# Patient Record
Sex: FEMALE | URBAN - METROPOLITAN AREA
[De-identification: names, ages, dates, MRNs, and addresses within clinical notes are randomized per-mention and may not be internally consistent; named-entity substitution may affect disease eponyms.]

---

## 2021-08-12 ENCOUNTER — OFFICE VISIT (OUTPATIENT)
Dept: OBGYN CLINIC | Age: 41
End: 2021-08-12
Payer: MEDICAID

## 2021-08-12 VITALS — SYSTOLIC BLOOD PRESSURE: 132 MMHG | DIASTOLIC BLOOD PRESSURE: 84 MMHG | WEIGHT: 134 LBS

## 2021-08-12 DIAGNOSIS — N92.6 MISSED MENSES: ICD-10-CM

## 2021-08-12 DIAGNOSIS — N97.9 INFERTILITY, FEMALE: ICD-10-CM

## 2021-08-12 DIAGNOSIS — N92.6 IRREGULAR PERIODS: Primary | ICD-10-CM

## 2021-08-12 LAB
HCG URINE, QL. (POC): NEGATIVE
VALID INTERNAL CONTROL?: YES

## 2021-08-12 PROCEDURE — 99203 OFFICE O/P NEW LOW 30 MIN: CPT | Performed by: OBSTETRICS & GYNECOLOGY

## 2021-08-12 PROCEDURE — 81025 URINE PREGNANCY TEST: CPT | Performed by: OBSTETRICS & GYNECOLOGY

## 2021-08-12 NOTE — PROGRESS NOTES
Problem Visit    Jonnathan Orourke is a 39 y.o.  presenting for problem visit. Her main concern today is irregular menses. Entire visit (nurse and doctor) performed using translation services with [email or web address] . Patient report she has not had a period since April 14. Prior to this time she had regular monthly periods, but they had been very light. She had not done a home UPT since menses stopped, this was negative in the office today. Patient reports she has tried to get pregnant for 17 years without success. She reports she saw a fertility specialist 5-6 years ago in [place] named Dr. Lolita Cordova. No pmh, no recent life or medical stressors/changes recently. Ob/Gyn Hx:  G0  LMP- 4/14/21  Menses- currently absent  Contraception- none  SA- yes, male      Past Medical History:   Diagnosis Date    H/O seasonal allergies 3/11/2013       No past surgical history on file. No family history on file. Social History     Socioeconomic History    Marital status: SINGLE     Spouse name: Not on file    Number of children: Not on file    Years of education: Not on file    Highest education level: Not on file   Occupational History    Not on file   Tobacco Use    Smoking status: Not on file   Substance and Sexual Activity    Alcohol use: Not on file    Drug use: Not on file    Sexual activity: Not on file   Other Topics Concern    Not on file   Social History Narrative    Not on file     Social Determinants of Health     Financial Resource Strain:     Difficulty of Paying Living Expenses:    Food Insecurity:     Worried About Running Out of Food in the Last Year:     [address] in the Last Year:    Transportation Needs:     Lack of Transportation (Medical):      Lack of Transportation (Non-Medical):    Physical Activity:     Days of Exercise per Week:     Minutes of Exercise per Session:    Stress:     Feeling of Stress :    Social Connections:     Frequency of Communication with Friends and Family:     Frequency of Social Gatherings with Friends and Family:     Attends [employer] Services:     Active Member of Clubs or Organizations:     Attends Club or Organization Meetings:     Marital Status:    Intimate Partner Violence:     Fear of Current or Ex-Partner:     Emotionally Abused:     Physically Abused:     Sexually Abused:            No Known Allergies    Review of Systems - History obtained from the patient  Constitutional: negative for weight loss, fever, night sweats  HEENT: negative for hearing loss, earache, congestion, snoring, sorethroat  CV: negative for chest pain, palpitations, edema  Resp: negative for cough, shortness of breath, wheezing  GI: negative for change in bowel habits, abdominal pain, black or bloody stools  : negative for frequency, dysuria, hematuria, vaginal discharge  MSK: negative for back pain, joint pain, muscle pain  Breast: negative for breast lumps, nipple discharge, galactorrhea  Skin :negative for itching, rash, hives  Neuro: negative for dizziness, headache, confusion, weakness  Psych: negative for anxiety, depression, change in mood  Heme/lymph: negative for bleeding, bruising, pallor    Physical Exam    Visit Vitals  /84 (BP 1 Location: Left arm, BP Patient Position: Sitting)   Wt 134 lb (60.8 kg)         OBGyn Exam      Constitutional  · Appearance: well-nourished, well developed, alert, in no acute distress    HENT  · Head and Face: appears normal    Neck  · Inspection/Palpation: normal appearance, no masses or tenderness  · Thyroid: gland size normal, nontender    Chest  · Respiratory Effort: non-labored breathing    Cardiovascular  · Extremities: no peripheral edema    Gastrointestinal  · Abdominal Examination: abdomen non-distended, non-tender to palpation, no masses present  · Liver and spleen: no hepatomegaly present, spleen not palpable  · Hernias: no hernias identified      Skin  · General Inspection: no rash, no lesions identified    Neurologic/Psychiatric  · Mental Status:  · Orientation: grossly oriented to person, place and time  · Mood and Affect: mood normal, affect appropriate      Assessment/Plan:    1. Missed menses    - AMB POC URINE PREGNANCY TEST, VISUAL COLOR COMPARISON    2. Irregular periods  Discussed her 3 months of amenorrhea, with previously normal menses. UPT today negative. Discussed possible causes of irregular menses including hormonal causes. Check labs today. Also discussed her long history of primary infertility and she expresses that she still would like to become pregnant. Referral to [address] [facility] [address] given. - TESTOSTERONE, FREE & TOTAL; Future  - TSH AND FREE T4; Future  - PROLACTIN; Future  - FSH AND LH; Future  - ESTRADIOL; Future  - ANTI-MULLERIAN HORMONE ([facility]); Future  - TSH AND FREE T4  - ANTI-MULLERIAN HORMONE ([facility])  - ESTRADIOL  - FSH AND LH  - HEMOGLOBIN A1C WITH EAG  - PROLACTIN  - TESTOSTERONE, FREE & TOTAL    3. Infertility, female    - ANTI-MULLERIAN HORMONE ([address]);  Future  - ANTI-MULLERIAN HORMONE ([facility])          Dawson Estrada MD

## 2021-08-12 NOTE — PATIENT INSTRUCTIONS
Secondary Amenorrhea: Care Instructions  Your Care Instructions     Amenorrhea means you do not have menstrual periods. There are two types. Primary amenorrhea means you never start your periods. Secondary amenorrhea means you have had periods, and then they stop, especially for more than 3 months. Even if you don't have periods, you could still get pregnant. You may not know what caused your periods to stop. Possible causes include pregnancy, hormonal changes, and losing or gaining a lot of weight quickly. Some medicines and stress could also cause it. Being active in endurance sports can also cause you to miss your period or stop menstruating. Female athletes may try to lose or maintain weight in harmful ways. These include dieting too much or binging and purging. But doing these things can lead to eating disorders, amenorrhea, and osteoporosis. If you exercise less or gain a little weight, your periods will probably start again. Your doctor may order tests to find out why your periods have stopped. Your doctor may give you the hormone progestin. It can cause you to have a period. Talk to your doctor if you do not have a period for 3 months or more. Going for a long amount of time without a period can raise your chance of getting cancer of the lining of the uterus later in life. Follow-up care is a key part of your treatment and safety. Be sure to make and go to all appointments, and call your doctor if you are having problems. It's also a good idea to know your test results and keep a list of the medicines you take. How can you care for yourself at home? · Eat a healthy, balanced diet. This includes fruits, vegetables, whole grains, proteins, and low-fat dairy products. · Do light exercise, unless your doctor told you not to exercise. · Use birth control if you do not want to get pregnant. When should you call for help?    Call your doctor now or seek immediate medical care if:    · You have severe vaginal bleeding.     · You have new or worse belly or pelvic pain. Watch closely for changes in your health, and be sure to contact your doctor if:    · You have unusual vaginal bleeding.     · You think you might be pregnant.     · You do not get better as expected. Where can you learn more? Go to http://www.gray.com/  Enter E177 in the search box to learn more about \"Secondary Amenorrhea: Care Instructions. \"  Current as of: July 17, 2020               Content Version: 12.8  © 2006-2021 [employer]. Care instructions adapted under license by [employer] (which disclaims liability or warranty for this information). If you have questions about a medical condition or this instruction, always ask your healthcare professional. [address] any warranty or liability for your use of this information.

## 2021-08-13 LAB
ESTRADIOL SERPL-MCNC: 16.5 PG/ML
FSH SERPL-ACNC: 100.4 MIU/ML
LH SERPL-ACNC: 53.8 MIU/ML
PROLACTIN SERPL-MCNC: 11.7 NG/ML
SPECIMEN STATUS REPORT, ROLRST: NORMAL

## 2021-08-15 LAB — MIS SERPL-MCNC: <0.015 NG/ML

## 2021-08-16 NOTE — PROGRESS NOTES
Called pt using a [facility]  to discuss labs. We discussed that her FSH/estradiol/AMH all indicate that they are in the post-menopausal range. This explains her amenorrhea and her prior difficulty conceiving. Discussed that I don't think it would be possible for her to conceive spontaneously, but they could consider discussion with [facility] re donor egg/embryo if they desire to inquire about those options.

## 2021-08-17 LAB
TESTOST FREE SERPL-MCNC: 0.7 PG/ML (ref 0–4.2)
TESTOST SERPL-MCNC: 3 NG/DL (ref 4–50)